# Patient Record
Sex: MALE | Race: BLACK OR AFRICAN AMERICAN | NOT HISPANIC OR LATINO | Employment: FULL TIME | URBAN - METROPOLITAN AREA
[De-identification: names, ages, dates, MRNs, and addresses within clinical notes are randomized per-mention and may not be internally consistent; named-entity substitution may affect disease eponyms.]

---

## 2017-12-23 ENCOUNTER — OFFICE VISIT (OUTPATIENT)
Dept: URGENT CARE | Facility: CLINIC | Age: 42
End: 2017-12-23
Payer: COMMERCIAL

## 2017-12-23 VITALS
WEIGHT: 270 LBS | RESPIRATION RATE: 16 BRPM | TEMPERATURE: 97 F | BODY MASS INDEX: 38.65 KG/M2 | SYSTOLIC BLOOD PRESSURE: 189 MMHG | OXYGEN SATURATION: 98 % | HEIGHT: 70 IN | DIASTOLIC BLOOD PRESSURE: 120 MMHG | HEART RATE: 84 BPM

## 2017-12-23 DIAGNOSIS — I10 HYPERTENSION, UNSPECIFIED TYPE: ICD-10-CM

## 2017-12-23 DIAGNOSIS — H61.21 CERUMEN DEBRIS ON TYMPANIC MEMBRANE OF RIGHT EAR: Primary | ICD-10-CM

## 2017-12-23 PROCEDURE — 99203 OFFICE O/P NEW LOW 30 MIN: CPT | Mod: 25,S$GLB,, | Performed by: EMERGENCY MEDICINE

## 2017-12-23 PROCEDURE — 69210 REMOVE IMPACTED EAR WAX UNI: CPT | Mod: S$GLB,,, | Performed by: EMERGENCY MEDICINE

## 2017-12-23 RX ORDER — AMLODIPINE BESYLATE 10 MG/1
10 TABLET ORAL DAILY
Qty: 60 TABLET | Refills: 1 | Status: SHIPPED | OUTPATIENT
Start: 2017-12-23 | End: 2018-02-21

## 2017-12-23 NOTE — PROGRESS NOTES
"Subjective:       Patient ID: Devonte Linda is a 42 y.o. male.    Vitals:  height is 5' 10" (1.778 m) and weight is 122.5 kg (270 lb). His temperature is 97.3 °F (36.3 °C). His blood pressure is 189/120 (abnormal) and his pulse is 84. His respiration is 16 and oxygen saturation is 98%.     Chief Complaint: Otalgia (right ear feels clogged.)    Has had intermittent high readings for years.      Otalgia    There is pain in the right ear. This is a new problem. The current episode started in the past 7 days. The problem has been unchanged. Pertinent negatives include no abdominal pain, diarrhea, headaches, rash, sore throat or vomiting.     Review of Systems   Constitution: Negative for chills and fever.   HENT: Positive for ear pain. Negative for sore throat.    Eyes: Negative for blurred vision.   Cardiovascular: Negative for chest pain.   Respiratory: Negative for shortness of breath.    Skin: Negative for rash.   Musculoskeletal: Negative for back pain and joint pain.   Gastrointestinal: Negative for abdominal pain, diarrhea, nausea and vomiting.   Neurological: Negative for headaches.   Psychiatric/Behavioral: The patient is not nervous/anxious.        Objective:      Physical Exam   Constitutional: He is oriented to person, place, and time. He appears well-developed and well-nourished. He is cooperative.  Non-toxic appearance. He does not appear ill. No distress.   HENT:   Head: Normocephalic and atraumatic.   Right Ear: Hearing, tympanic membrane and external ear normal.   Left Ear: Hearing, tympanic membrane, external ear and ear canal normal.   Nose: Nose normal. No mucosal edema, rhinorrhea or nasal deformity. No epistaxis. Right sinus exhibits no maxillary sinus tenderness and no frontal sinus tenderness. Left sinus exhibits no maxillary sinus tenderness and no frontal sinus tenderness.   Mouth/Throat: Uvula is midline, oropharynx is clear and moist and mucous membranes are normal. No trismus in the jaw. " Normal dentition. No uvula swelling. No posterior oropharyngeal erythema.   Cerumen impaction   Eyes: Conjunctivae and lids are normal. Right eye exhibits no discharge. Left eye exhibits no discharge. No scleral icterus.   Sclera clear bilat   Neck: Trachea normal, normal range of motion, full passive range of motion without pain and phonation normal. Neck supple.   Cardiovascular: Normal rate, regular rhythm, normal heart sounds and normal pulses.    Pulmonary/Chest: Effort normal and breath sounds normal. No respiratory distress.   Abdominal: Normal appearance. He exhibits no pulsatile midline mass.   Musculoskeletal: Normal range of motion. He exhibits no edema or deformity.   Neurological: He is alert and oriented to person, place, and time. He exhibits normal muscle tone. Coordination normal.   Skin: Skin is warm, dry and intact. He is not diaphoretic. No pallor.   Psychiatric: He has a normal mood and affect. His speech is normal and behavior is normal. Judgment and thought content normal. Cognition and memory are normal.   Nursing note and vitals reviewed.      Assessment:       1. Cerumen debris on tympanic membrane of right ear    2. Hypertension, unspecified type        Plan:         Cerumen debris on tympanic membrane of right ear  -     EAR CERUMEN REMOVAL    Hypertension, unspecified type  -     amLODIPine (NORVASC) 10 MG tablet; Take 1 tablet (10 mg total) by mouth once daily.  Dispense: 60 tablet; Refill: 1

## 2017-12-23 NOTE — PROCEDURES
Ear Cerumen Removal  Date/Time: 12/23/2017 1:30 PM  Performed by: TITUS CUADRA  Authorized by: TITUS CUADRA       Local anesthetic:  None  Medication Used:  Other  Location details:  Right ear  Procedure type: irrigation    Cerumen  Removal Results:  Cerumen completely removed  Patient tolerance:  Patient tolerated the procedure well with no immediate complications